# Patient Record
Sex: MALE | Race: WHITE | Employment: OTHER | URBAN - METROPOLITAN AREA
[De-identification: names, ages, dates, MRNs, and addresses within clinical notes are randomized per-mention and may not be internally consistent; named-entity substitution may affect disease eponyms.]

---

## 2018-06-26 ENCOUNTER — TELEPHONE (OUTPATIENT)
Dept: PHARMACY | Facility: CLINIC | Age: 68
End: 2018-06-26

## 2018-10-15 ENCOUNTER — HOSPITAL ENCOUNTER (OUTPATIENT)
Dept: GENERAL RADIOLOGY | Age: 68
Discharge: HOME | End: 2018-10-17
Payer: COMMERCIAL

## 2018-10-15 ENCOUNTER — HOSPITAL ENCOUNTER (OUTPATIENT)
Dept: MRI IMAGING | Age: 68
Discharge: HOME | End: 2018-10-17
Payer: COMMERCIAL

## 2018-10-15 DIAGNOSIS — M75.82 ROTATOR CUFF TENDONITIS, LEFT: ICD-10-CM

## 2018-10-15 DIAGNOSIS — M75.81 ROTATOR CUFF TENDINITIS, RIGHT: ICD-10-CM

## 2018-10-15 DIAGNOSIS — M77.8 TENDINITIS OF RIGHT SHOULDER: ICD-10-CM

## 2018-10-15 DIAGNOSIS — M24.9 DERANGEMENT OF RIGHT SHOULDER JOINT: ICD-10-CM

## 2018-10-15 PROCEDURE — 73040 CONTRAST X-RAY OF SHOULDER: CPT

## 2018-10-15 PROCEDURE — 6360000004 HC RX CONTRAST MEDICATION: Performed by: RADIOLOGY

## 2018-10-15 PROCEDURE — A9579 GAD-BASE MR CONTRAST NOS,1ML: HCPCS | Performed by: RADIOLOGY

## 2018-10-15 PROCEDURE — 73222 MRI JOINT UPR EXTREM W/DYE: CPT

## 2018-10-15 RX ADMIN — IOPAMIDOL 10 ML: 612 INJECTION, SOLUTION INTRAVENOUS at 09:44

## 2018-10-15 RX ADMIN — GADOTERIDOL 18.14 MMOL: 279.3 INJECTION, SOLUTION INTRAVENOUS at 09:44

## 2018-11-19 ENCOUNTER — TELEPHONE (OUTPATIENT)
Dept: ADMINISTRATIVE | Age: 68
End: 2018-11-19

## 2019-04-24 ENCOUNTER — TELEPHONE (OUTPATIENT)
Dept: ORTHOPEDIC SURGERY | Age: 69
End: 2019-04-24

## 2019-04-24 DIAGNOSIS — M25.511 CHRONIC RIGHT SHOULDER PAIN: Primary | ICD-10-CM

## 2019-04-24 DIAGNOSIS — G89.29 CHRONIC RIGHT SHOULDER PAIN: Primary | ICD-10-CM

## 2019-04-25 ENCOUNTER — OFFICE VISIT (OUTPATIENT)
Dept: ORTHOPEDIC SURGERY | Age: 69
End: 2019-04-25
Payer: COMMERCIAL

## 2019-04-25 VITALS
BODY MASS INDEX: 32.2 KG/M2 | TEMPERATURE: 98.5 F | DIASTOLIC BLOOD PRESSURE: 91 MMHG | RESPIRATION RATE: 18 BRPM | HEIGHT: 71 IN | WEIGHT: 230 LBS | HEART RATE: 75 BPM | SYSTOLIC BLOOD PRESSURE: 142 MMHG

## 2019-04-25 DIAGNOSIS — M75.41 IMPINGEMENT SYNDROME OF RIGHT SHOULDER: Primary | ICD-10-CM

## 2019-04-25 DIAGNOSIS — M72.0 DUPUYTREN'S CONTRACTURE OF RIGHT HAND: ICD-10-CM

## 2019-04-25 DIAGNOSIS — M72.0 DUPUYTREN'S CONTRACTURE OF LEFT HAND: ICD-10-CM

## 2019-04-25 PROCEDURE — 20605 DRAIN/INJ JOINT/BURSA W/O US: CPT | Performed by: ORTHOPAEDIC SURGERY

## 2019-04-25 PROCEDURE — 99203 OFFICE O/P NEW LOW 30 MIN: CPT | Performed by: ORTHOPAEDIC SURGERY

## 2019-04-25 RX ORDER — OMEPRAZOLE 20 MG/1
20 CAPSULE, DELAYED RELEASE ORAL DAILY
COMMUNITY

## 2019-04-25 RX ORDER — GLIPIZIDE 5 MG/1
5 TABLET ORAL
COMMUNITY

## 2019-04-25 RX ORDER — IBUPROFEN 200 MG
200 TABLET ORAL EVERY 6 HOURS PRN
COMMUNITY

## 2019-04-25 RX ORDER — SERTRALINE HYDROCHLORIDE 100 MG/1
100 TABLET, FILM COATED ORAL DAILY
COMMUNITY

## 2019-04-25 RX ORDER — HYDROCHLOROTHIAZIDE 12.5 MG/1
12.5 CAPSULE, GELATIN COATED ORAL DAILY
COMMUNITY

## 2019-04-25 RX ORDER — TRIAMCINOLONE ACETONIDE 40 MG/ML
80 INJECTION, SUSPENSION INTRA-ARTICULAR; INTRAMUSCULAR ONCE
Status: COMPLETED | OUTPATIENT
Start: 2019-04-25 | End: 2019-04-25

## 2019-04-25 RX ORDER — LISINOPRIL 5 MG/1
5 TABLET ORAL DAILY
COMMUNITY

## 2019-04-25 RX ADMIN — TRIAMCINOLONE ACETONIDE 80 MG: 40 INJECTION, SUSPENSION INTRA-ARTICULAR; INTRAMUSCULAR at 11:37

## 2019-04-25 NOTE — PROGRESS NOTES
Department of Orthopedic Surgery  History and Physical      CHIEF COMPLAINT:  Right shoulder pain, bilateral hand nodules    HISTORY OF PRESENT ILLNESS:                The patient is a RHD 79 y.o. male who presents with right shoulder pain and bilateral hand nodules. Patient has had right shoulder pain for the last couple months. On October 29 he did have a surgery done by Dr. Ed Heart. He did have a right shoulder arthroscopy with biceps tenotomy, no decompression. Scope was never placed in the subacromial space. No rotator cuff tear was seen per the operative report. After the surgery was in the sling until his 1st postop visit. After's 1st postoperative visit he worked on range of motion by himself. He never was referred to therapy. He followed up with his PCP who provided him with a systemic cortisone injection in a Medrol Dosepak this was the end of January. He states this minimally helped his pain. Not had any other treatments. Pain is with any overhead lifting and internal rotation. He presents with persistent impingement syndrome. He also reports a few year history of bilateral small finger nodules and cords. No family history of Dupuytrens. His right small finger is most bothersome to him. Past Medical History:    No past medical history on file. Past Surgical History:    No past surgical history on file. Current Medications:   No current facility-administered medications for this visit. Allergies:  Benicar [olmesartan]    Social History:   TOBACCO:   reports that he has never smoked. He has never used smokeless tobacco.  ETOH:   has no alcohol history on file. DRUGS:   has no drug history on file. ACTIVITIES OF DAILY LIVING:    OCCUPATION:    Family History:   No family history on file.     REVIEW OF SYSTEMS:  CONSTITUTIONAL:  negative  EYES:  negative  HEENT:  negative  RESPIRATORY:  negative  CARDIOVASCULAR:  negative  GASTROINTESTINAL:  negative  GENITOURINARY:  negative  INTEGUMENT/BREAST: negative  HEMATOLOGIC/LYMPHATIC:  negative  ALLERGIC/IMMUNOLOGIC:  negative  ENDOCRINE:  negative  MUSCULOSKELETAL:  pain  NEUROLOGICAL:  negative  BEHAVIOR/PSYCH:  negative    PHYSICAL EXAM:    VITALS:  BP (!) 142/91 (Site: Left Upper Arm, Position: Sitting)   Pulse 75   Temp 98.5 °F (36.9 °C) (Oral)   Resp 18   Ht 5' 11\" (1.803 m)   Wt 230 lb (104.3 kg)   BMI 32.08 kg/m²   CONSTITUTIONAL:  awake, alert, cooperative, no apparent distress, and appears stated age  EYES:  Lids and lashes normal, pupils equal, round and reactive to light, extra ocular muscles intact, sclera clear, conjunctiva normal  ENT:  Normocephalic, without obvious abnormality, atraumatic, sinuses nontender on palpation, external ears without lesions, oral pharynx with moist mucus membranes, tonsils without erythema or exudates, gums normal and good dentition. NECK:  Supple, symmetrical, trachea midline, no adenopathy, thyroid symmetric, not enlarged and no tenderness, skin normal  NEUROLOGIC:  Awake, alert, oriented to name, place and time. Cranial nerves II-XII are grossly intact. Motor is 5 out of 5 bilaterally. Sensory is intact.  gait is normal.  MUSCULOSKELETAL:    Right upper extremity: - tenderness over the anterior aspect of the shoulder.  degrees, abduction 90 degrees, internal rotation with pain to the sacrum, ER 15 degrees. + pain with drop arm test, but negative drop arm. Good strength of the rotator cuff. Very positive impingement. Small finger with Dupuytren's nodule, pitting and cord. MCP joint contracture of 20°. Full flexion of the fingers. Brisk capillary refill. Otherwise neurovascularly intact. Left hand: Dupuytren's pitting, nodule and cord with MCP joint contracture of 10°. Brisk capillary refill. Otherwise NVI.      DATA:    CBC:   Lab Results   Component Value Date    WBC 9.0 04/21/2017    RBC 5.87 04/21/2017    HGB 17.1 04/21/2017    HCT 50.4 04/21/2017    MCV 85.9 04/21/2017    MCH 29.1 04/21/2017 [facility] 33.9 04/21/2017    RDW 13.2 04/21/2017     04/21/2017    MPV 10.5 04/21/2017     PT/INR:  No results found for: PROTIME, INR    Radiology Review:  X-rays of the right shoulder were obtained at today's visit. 3 views: AP, scapular Y view, axial demonstrate no acute fracture dislocations of the right shoulder. High riding humerus with decreased SAS. Impression: no acute fractures or dislocations of the right shoulder    Preoperative MRI of the right shoulder was reviewed in coronal, sagittal and axial planes. This demonstrates tendinopathy of the supraspinatus and subscapularis with evidence of partial tear. No evidence of full-thickness rotator cuff tear. Small amount of fluid in the subacromial subdeltoid bursa representing bursitis. Moderate degenerative joint disease of the acromioclavicular and glenohumeral joints. 4mm labral cyst.    IMPRESSION:  · Persistent right shoulder impingement syndrome 6 months status post shoulder arthroscopy and biceps tenotomy  · Bilateral Dupuytren's contractures of the small fingers    PLAN:  Discussed findings with the patient. In regards to his shoulder recommended a cortisone injection and referral to therapy for his persistent impingement syndrome after surgery. Patient consented and injection was tolerated well. Also discussed Dupuytren's with the patient. Education and literature was provided to the patient. Discussed he could consider a Xiaflex injection to the small finger on the right. Patient like to hold off at this time. If he desires us we will get bilateral hand x-rays at his next visit. Follow-up after therapy has been completed on the shoulder. Procedure Note Cortisone Injection to Shoulder    The right shoulder was identified as the injection site. The risk and benefits of a cortisone injection were explained and the patient consented to the injection.  Under sterile conditions, the shoulder SAS was injected with a mixture of 80mg of Kenelog and 4 mL

## 2019-06-06 ENCOUNTER — OFFICE VISIT (OUTPATIENT)
Dept: ORTHOPEDIC SURGERY | Age: 69
End: 2019-06-06
Payer: COMMERCIAL

## 2019-06-06 VITALS
RESPIRATION RATE: 18 BRPM | DIASTOLIC BLOOD PRESSURE: 97 MMHG | TEMPERATURE: 98.1 F | HEART RATE: 77 BPM | SYSTOLIC BLOOD PRESSURE: 138 MMHG

## 2019-06-06 DIAGNOSIS — M72.0 DUPUYTREN'S CONTRACTURE OF RIGHT HAND: Primary | ICD-10-CM

## 2019-06-06 PROCEDURE — 99213 OFFICE O/P EST LOW 20 MIN: CPT | Performed by: ORTHOPAEDIC SURGERY

## 2019-06-06 NOTE — PROGRESS NOTES
Not on file    Drug use: Not on file    Sexual activity: Not on file   Lifestyle    Physical activity:     Days per week: Not on file     Minutes per session: Not on file    Stress: Not on file   Relationships    Social connections:     Talks on phone: Not on file     Gets together: Not on file     Attends Jew service: Not on file     Active member of club or organization: Not on file     Attends meetings of clubs or organizations: Not on file     Relationship status: Not on file    Intimate partner violence:     Fear of current or ex partner: Not on file     Emotionally abused: Not on file     Physically abused: Not on file     Forced sexual activity: Not on file   Other Topics Concern    Not on file   Social History Narrative    Not on file     History reviewed. No pertinent family history. Skin: (-) rash,(-) psoriasis,(-) eczema, (-)skin cancer. Musculoskeletal: (-) fractures,  (-) dislocations,(-) collagen vascular disease, (-) fibromyalgia, (-) multiple sclerosis, (-) muscular dystrophy, (-) RSD,(-) joint pain (-)swelling, (-) joint pain,swelling. Neurologic: (-) epilepsy, (-)seizures,(-) brain tumor,(-) TIA, (-)stroke, (-)headaches, (-)Parkinson disease,(-) memory loss, (-) LOC. Cardiovascular: (-) Chest pain, (-) swelling in legs/feet, (-) SOB, (-) cramping in legs/feet with walking. SUBJECTIVE:      Constitutional:    The patient is alert and oriented x 3, appears to be stated age and in no distress.       Right upper extremity:  Small finger with Dupuytren's nodule, pitting and cord. MCP joint contracture of 30°. Adjacent ring finger with contracture of 10°. Radial, median and ulnar nerves are intact to light touch. Full digital flexion. Full flexion of the fingers. negative atrophy. 2+ radial pulse. Brisk at refill all digits. Negative Wartenberg's and cross finger testing. Brisk capillary refill. Otherwise neurovascularly intact.             Impression: Right Little finger MCP joint Dupuytren's contracture of 30° and adjacent ring finger contracture of 10°. Plan:     We'll plan for Xiaflex injection. All questions answered. The risks, benefits and options have been discussed with the patient who agrees to proceed with Xiaflex injections.  We will contact His insurance company for approval.

## 2019-07-09 ENCOUNTER — OFFICE VISIT (OUTPATIENT)
Dept: ORTHOPEDIC SURGERY | Age: 69
End: 2019-07-09
Payer: COMMERCIAL

## 2019-07-09 VITALS
RESPIRATION RATE: 18 BRPM | WEIGHT: 229.94 LBS | BODY MASS INDEX: 32.19 KG/M2 | SYSTOLIC BLOOD PRESSURE: 157 MMHG | TEMPERATURE: 97.7 F | DIASTOLIC BLOOD PRESSURE: 98 MMHG | HEIGHT: 71 IN | HEART RATE: 73 BPM

## 2019-07-09 DIAGNOSIS — M72.0 DUPUYTREN'S CONTRACTURE OF RIGHT HAND: Primary | ICD-10-CM

## 2019-07-09 PROCEDURE — 20527 NJX NZM PALMAR FASCIAL CORD: CPT | Performed by: ORTHOPAEDIC SURGERY

## 2019-07-09 PROCEDURE — 99212 OFFICE O/P EST SF 10 MIN: CPT | Performed by: ORTHOPAEDIC SURGERY

## 2019-07-09 NOTE — PROGRESS NOTES
Chief Complaint   Patient presents with    Follow-up     Dupuytren's contracture right hand; Karely Pardo today         Manju Nuñez is a 79y.o. year old  who presents for follow up of right shoulder and right hand. He reports the shoulder is doing well status post cortisone injection and therapy exercises. He presents today with persistent contracture of the right small finger and adjacent ring finger. He is interested in proceeding with possible Xiaflex injection. Presents for injection of his Dupuytren's contracture    History reviewed. No pertinent past medical history. History reviewed. No pertinent surgical history.     Current Outpatient Medications:     glipiZIDE (GLUCOTROL) 5 MG tablet, Take 5 mg by mouth 2 times daily (before meals), Disp: , Rfl:     hydrochlorothiazide (MICROZIDE) 12.5 MG capsule, Take 12.5 mg by mouth daily, Disp: , Rfl:     ibuprofen (ADVIL;MOTRIN) 200 MG tablet, Take 200 mg by mouth every 6 hours as needed for Pain, Disp: , Rfl:     lisinopril (PRINIVIL;ZESTRIL) 5 MG tablet, Take 5 mg by mouth daily, Disp: , Rfl:     metFORMIN (GLUCOPHAGE) 500 MG tablet, Take 500 mg by mouth 2 times daily (with meals), Disp: , Rfl:     omeprazole (PRILOSEC) 20 MG delayed release capsule, Take 20 mg by mouth daily, Disp: , Rfl:     sertraline (ZOLOFT) 100 MG tablet, Take 100 mg by mouth daily, Disp: , Rfl:   Allergies   Allergen Reactions    Benicar [Olmesartan]      Tongue swelling     Social History     Socioeconomic History    Marital status:      Spouse name: Not on file    Number of children: Not on file    Years of education: Not on file    Highest education level: Not on file   Occupational History    Not on file   Social Needs    Financial resource strain: Not on file    Food insecurity:     Worry: Not on file     Inability: Not on file    Transportation needs:     Medical: Not on file     Non-medical: Not on file   Tobacco Use    Smoking status: Never Smoker    Otherwise neurovascularly intact. Impression: Right Little finger MCP joint Dupuytren's contracture of 30° and adjacent ring finger contracture of 10°. Plan:       Indications:     Joseph Santos comes to office today for Xiaflex injections for his Dupuytren contracture involving his right hand. The treated finger today was the little finger with MP contracture of 30 degrees and PIP contracture of 0 degrees. Procedure:     Xiaflex 0.58mg was reconstituted with injectable saline 0.39ml using sterile technique. The finger was prepped with alcohol swabs. Norma Drain was drawn back into the syringe and 0.35cc was injected in three places approximately 2ml apart along the palpable cord causing the contracture of the MCP joint. There were no complications. Plan:  The patient will be seen tomorrow morning in office for finger manipulation under local anesthesia.

## 2019-07-11 ENCOUNTER — OFFICE VISIT (OUTPATIENT)
Dept: ORTHOPEDIC SURGERY | Age: 69
End: 2019-07-11
Payer: COMMERCIAL

## 2019-07-11 VITALS — HEART RATE: 80 BPM | RESPIRATION RATE: 18 BRPM | SYSTOLIC BLOOD PRESSURE: 140 MMHG | DIASTOLIC BLOOD PRESSURE: 89 MMHG

## 2019-07-11 DIAGNOSIS — M72.0 DUPUYTREN'S CONTRACTURE OF RIGHT HAND: Primary | ICD-10-CM

## 2019-07-11 PROCEDURE — 99212 OFFICE O/P EST SF 10 MIN: CPT | Performed by: ORTHOPAEDIC SURGERY

## 2019-07-11 NOTE — PROGRESS NOTES
Chief Complaint   Patient presents with    Follow-up     #2 Derrell Robi little finger         Lei Leyva is a 79y.o. year old  who presents for follow up of right shoulder and right hand. He reports the shoulder is doing well status post cortisone injection and therapy exercises. He presents today with persistent contracture of the right small finger and adjacent ring finger. He is seen back today status post Xiaflex injection. He reports no adverse reactions. History reviewed. No pertinent past medical history. History reviewed. No pertinent surgical history.     Current Outpatient Medications:     glipiZIDE (GLUCOTROL) 5 MG tablet, Take 5 mg by mouth 2 times daily (before meals), Disp: , Rfl:     hydrochlorothiazide (MICROZIDE) 12.5 MG capsule, Take 12.5 mg by mouth daily, Disp: , Rfl:     ibuprofen (ADVIL;MOTRIN) 200 MG tablet, Take 200 mg by mouth every 6 hours as needed for Pain, Disp: , Rfl:     lisinopril (PRINIVIL;ZESTRIL) 5 MG tablet, Take 5 mg by mouth daily, Disp: , Rfl:     metFORMIN (GLUCOPHAGE) 500 MG tablet, Take 500 mg by mouth 2 times daily (with meals), Disp: , Rfl:     omeprazole (PRILOSEC) 20 MG delayed release capsule, Take 20 mg by mouth daily, Disp: , Rfl:     sertraline (ZOLOFT) 100 MG tablet, Take 100 mg by mouth daily, Disp: , Rfl:   Allergies   Allergen Reactions    Benicar [Olmesartan]      Tongue swelling     Social History     Socioeconomic History    Marital status:      Spouse name: Not on file    Number of children: Not on file    Years of education: Not on file    Highest education level: Not on file   Occupational History    Not on file   Social Needs    Financial resource strain: Not on file    Food insecurity:     Worry: Not on file     Inability: Not on file    Transportation needs:     Medical: Not on file     Non-medical: Not on file   Tobacco Use    Smoking status: Never Smoker    Smokeless tobacco: Never Used   Substance

## 2019-07-23 ENCOUNTER — OFFICE VISIT (OUTPATIENT)
Dept: ORTHOPEDIC SURGERY | Age: 69
End: 2019-07-23
Payer: COMMERCIAL

## 2019-07-23 VITALS — RESPIRATION RATE: 18 BRPM | DIASTOLIC BLOOD PRESSURE: 90 MMHG | HEART RATE: 88 BPM | SYSTOLIC BLOOD PRESSURE: 141 MMHG

## 2019-07-23 DIAGNOSIS — M72.0 DUPUYTREN'S CONTRACTURE OF RIGHT HAND: Primary | ICD-10-CM

## 2019-07-23 PROCEDURE — 99212 OFFICE O/P EST SF 10 MIN: CPT | Performed by: ORTHOPAEDIC SURGERY

## 2021-09-24 LAB — PROSTATE SPECIFIC ANTIGEN: 0.41 NG/ML (ref 0–4)
